# Patient Record
Sex: FEMALE | Race: WHITE | NOT HISPANIC OR LATINO | URBAN - METROPOLITAN AREA
[De-identification: names, ages, dates, MRNs, and addresses within clinical notes are randomized per-mention and may not be internally consistent; named-entity substitution may affect disease eponyms.]

---

## 2017-09-08 ENCOUNTER — OFFICE (OUTPATIENT)
Dept: URBAN - METROPOLITAN AREA CLINIC 33 | Facility: CLINIC | Age: 62
End: 2017-09-08

## 2017-09-08 VITALS
DIASTOLIC BLOOD PRESSURE: 64 MMHG | HEART RATE: 69 BPM | HEIGHT: 60 IN | SYSTOLIC BLOOD PRESSURE: 122 MMHG | TEMPERATURE: 97.5 F | WEIGHT: 215 LBS

## 2017-09-08 DIAGNOSIS — D50.9 IRON DEFICIENCY ANEMIA, UNSPECIFIED: ICD-10-CM

## 2017-09-08 DIAGNOSIS — B17.10 ACUTE HEPATITIS C WITHOUT HEPATIC COMA: ICD-10-CM

## 2017-09-08 DIAGNOSIS — K30 FUNCTIONAL DYSPEPSIA: ICD-10-CM

## 2017-09-08 PROCEDURE — 99244 OFF/OP CNSLTJ NEW/EST MOD 40: CPT

## 2017-09-08 PROCEDURE — 00031: CPT

## 2017-10-17 ENCOUNTER — ON CAMPUS - OUTPATIENT (OUTPATIENT)
Dept: URBAN - METROPOLITAN AREA HOSPITAL 35 | Facility: HOSPITAL | Age: 62
End: 2017-10-17

## 2017-10-17 DIAGNOSIS — K29.60 OTHER GASTRITIS WITHOUT BLEEDING: ICD-10-CM

## 2017-10-17 DIAGNOSIS — D50.9 IRON DEFICIENCY ANEMIA, UNSPECIFIED: ICD-10-CM

## 2017-10-17 PROCEDURE — 45378 DIAGNOSTIC COLONOSCOPY: CPT

## 2017-10-17 PROCEDURE — 43239 EGD BIOPSY SINGLE/MULTIPLE: CPT

## 2017-12-19 ENCOUNTER — OFFICE (OUTPATIENT)
Dept: URBAN - METROPOLITAN AREA CLINIC 33 | Facility: CLINIC | Age: 62
End: 2017-12-19

## 2017-12-19 VITALS
TEMPERATURE: 97.2 F | DIASTOLIC BLOOD PRESSURE: 62 MMHG | WEIGHT: 210 LBS | HEIGHT: 61 IN | SYSTOLIC BLOOD PRESSURE: 90 MMHG | HEART RATE: 67 BPM

## 2017-12-19 DIAGNOSIS — R74.8 ABNORMAL LEVELS OF OTHER SERUM ENZYMES: ICD-10-CM

## 2017-12-19 DIAGNOSIS — R53.83 OTHER FATIGUE: ICD-10-CM

## 2017-12-19 DIAGNOSIS — Z86.19 PERSONAL HISTORY OF OTHER INFECTIOUS AND PARASITIC DISEASES: ICD-10-CM

## 2017-12-19 DIAGNOSIS — D50.9 IRON DEFICIENCY ANEMIA, UNSPECIFIED: ICD-10-CM

## 2017-12-19 PROCEDURE — 99214 OFFICE O/P EST MOD 30 MIN: CPT

## 2017-12-19 NOTE — INTERFACERESULTNOTES
CMP not done but based on prior liver enzymes and current INR results, would go ahead with Fibroscan to check liver for levels of steatosis and fibrosis (scar tissue). Other labs here look good - no Hep C, no Hep B.

## 2017-12-19 NOTE — INTERFACERESULTNOTES
patient aware of results. Indicated she had the U/S complete today (1/5/18). Is questioning if the fibroscan is also necessary in addition to the U/S. Will send task to [facility] to verify.

## 2017-12-19 NOTE — SERVICEHPINOTES
63 yo female presents with her  for f/u anemia and also for elevated liver enzymes. Has had more health issues since fracturing right leg earlier this year. Is seeing Dr. Walker for anemia, Hgb was in 8 range in August. Has had 2 iron infusions with improved Hgb into 11 range. Her EGD and colonoscopy were unremarkable other than gastritis. No overt GI complaints, no black stools, etc. Has fatigue. Uses a dexamethasone mouth rinse for lichen planus. Also notes use of Magic Mouthwash that may contain both tetracycline and lidocaine which she has been swishing and swallowing for a long time. Takes pantoprazole 40 mg as well - h/o indigestion and GI complaints. Used to be on NSAIDs. Having spinal issues and also has h/o arthritis.   Notes elevated liver enzyme level - mild, this year. Has h/o Hep C, s/p treatment at [facility] circa 2001 with interferon and RBV, reportedly successful treatment. Has had low platelet count for a long time apparently. No h/o cirrhosis. 12/7/17 Hgb 11.9, plt 75, glucose 156, AST/ALT 55/31, bili 0.8, alb 3.7

## 2018-01-03 LAB
ALPHA-1-ANTITRYPSIN, SERUM: 135 MG/DL (ref 90–200)
AMBIG ABBREV LP DEFAULT: (no result)
ANTINUCLEAR ANTIBODIES, IFA: NEGATIVE
HBSAG SCREEN: NEGATIVE
HCV RT-PCR, QUANT (NON-GRAPH): HCV LOG10: (no result)
HCV RT-PCR, QUANT (NON-GRAPH): HEPATITIS C QUANTITATION: (no result) IU/ML
HCV RT-PCR, QUANT (NON-GRAPH): TEST INFORMATION: (no result)
HEMOGLOBIN A1C: 5.2 % (ref 4.8–5.6)
INSULIN: 8 UIU/ML (ref 2.6–24.9)
LIPID PANEL: CHOLESTEROL, TOTAL: 160 MG/DL (ref 100–199)
LIPID PANEL: HDL CHOLESTEROL: 68 MG/DL (ref 39–?)
LIPID PANEL: LDL CHOLESTEROL CALC: 83 MG/DL (ref 0–99)
LIPID PANEL: TRIGLYCERIDES: 46 MG/DL (ref 0–149)
LIPID PANEL: VLDL CHOLESTEROL CAL: 9 MG/DL (ref 5–40)
PROTHROMBIN TIME (PT): INR: 1.2 (ref 0.8–1.2)
PROTHROMBIN TIME (PT): PROTHROMBIN TIME: 12.5 SEC — HIGH (ref 9.1–12)
TSH: 2.56 UIU/ML (ref 0.45–4.5)
VITAMIN D, 25-HYDROXY: 42.9 NG/ML (ref 30–100)

## 2018-01-05 LAB
COMP. METABOLIC PANEL (14): A/G RATIO: 2.1 (ref 1.2–2.2)
COMP. METABOLIC PANEL (14): ALBUMIN, SERUM: 3.4 G/DL — LOW (ref 3.6–4.8)
COMP. METABOLIC PANEL (14): ALKALINE PHOSPHATASE, S: 84 IU/L (ref 39–117)
COMP. METABOLIC PANEL (14): ALT (SGPT): 23 IU/L (ref 0–32)
COMP. METABOLIC PANEL (14): AST (SGOT): 32 IU/L (ref 0–40)
COMP. METABOLIC PANEL (14): BILIRUBIN, TOTAL: 1 MG/DL (ref 0–1.2)
COMP. METABOLIC PANEL (14): BUN/CREATININE RATIO: 31 — HIGH (ref 12–28)
COMP. METABOLIC PANEL (14): BUN: 21 MG/DL (ref 8–27)
COMP. METABOLIC PANEL (14): CALCIUM, SERUM: 9.7 MG/DL (ref 8.7–10.3)
COMP. METABOLIC PANEL (14): CARBON DIOXIDE, TOTAL: 24 MMOL/L (ref 18–29)
COMP. METABOLIC PANEL (14): CHLORIDE, SERUM: 102 MMOL/L (ref 96–106)
COMP. METABOLIC PANEL (14): CREATININE, SERUM: 0.67 MG/DL (ref 0.57–1)
COMP. METABOLIC PANEL (14): EGFR IF AFRICN AM: 109 ML/MIN/1.73 (ref 59–?)
COMP. METABOLIC PANEL (14): EGFR IF NONAFRICN AM: 95 ML/MIN/1.73 (ref 59–?)
COMP. METABOLIC PANEL (14): GLOBULIN, TOTAL: 1.6 G/DL (ref 1.5–4.5)
COMP. METABOLIC PANEL (14): GLUCOSE, SERUM: 85 MG/DL (ref 65–99)
COMP. METABOLIC PANEL (14): POTASSIUM, SERUM: 4 MMOL/L (ref 3.5–5.2)
COMP. METABOLIC PANEL (14): PROTEIN, TOTAL, SERUM: 5 G/DL — LOW (ref 6–8.5)
COMP. METABOLIC PANEL (14): SODIUM, SERUM: 141 MMOL/L (ref 134–144)
WRITTEN AUTHORIZATION: (no result)

## 2018-01-16 ENCOUNTER — OFFICE (OUTPATIENT)
Dept: URBAN - METROPOLITAN AREA CLINIC 33 | Facility: CLINIC | Age: 63
End: 2018-01-16

## 2018-01-16 DIAGNOSIS — K25.7 CHRONIC GASTRIC ULCER WITHOUT HEMORRHAGE OR PERFORATION: ICD-10-CM

## 2018-01-16 DIAGNOSIS — D50.9 IRON DEFICIENCY ANEMIA, UNSPECIFIED: ICD-10-CM

## 2018-01-16 PROCEDURE — 91110 GI TRC IMG INTRAL ESOPH-ILE: CPT

## 2018-03-13 ENCOUNTER — OFFICE (OUTPATIENT)
Dept: URBAN - METROPOLITAN AREA CLINIC 33 | Facility: CLINIC | Age: 63
End: 2018-03-13

## 2018-03-13 VITALS
HEIGHT: 61 IN | HEART RATE: 57 BPM | TEMPERATURE: 97.7 F | WEIGHT: 199 LBS | DIASTOLIC BLOOD PRESSURE: 73 MMHG | SYSTOLIC BLOOD PRESSURE: 148 MMHG | SYSTOLIC BLOOD PRESSURE: 157 MMHG | DIASTOLIC BLOOD PRESSURE: 74 MMHG

## 2018-03-13 DIAGNOSIS — D50.9 IRON DEFICIENCY ANEMIA, UNSPECIFIED: ICD-10-CM

## 2018-03-13 DIAGNOSIS — R74.8 ABNORMAL LEVELS OF OTHER SERUM ENZYMES: ICD-10-CM

## 2018-03-13 DIAGNOSIS — Z86.19 PERSONAL HISTORY OF OTHER INFECTIOUS AND PARASITIC DISEASES: ICD-10-CM

## 2018-03-13 PROCEDURE — 99215 OFFICE O/P EST HI 40 MIN: CPT

## 2018-03-13 RX ORDER — PANTOPRAZOLE SODIUM 40 MG/1
TABLET, DELAYED RELEASE ORAL
Qty: 180 | Refills: 4 | Status: COMPLETED
End: 2024-03-28

## 2018-03-13 NOTE — SERVICEHPINOTES
61 yo female presents with her  for f/u anemia and also for elevated liver enzymes. Her small bowel videocapsule showed stomach ulcers. She is on pantoprazole 40 mg daily. Does also take ibuprofen and has additional risk factor of cirrhosis as found via Fibroscan. Her EGD this past fall was negative for H pylori.Her recent labs showed normal liver enzymes and no detected HCV and no other overt evidence of liver disease. Fibroscan did show moderate fatty liver and stage 4 fibrosis. She continues to follow with Dr. Walker for iron-deficiency anemia. Prior EGD and colonoscopy negative besides gastritis. Videocapsule just recently showed gastric ulcers. Prior celiac panel done by hematology reportedly negative. Prior hx: Patient has had more health issues since fracturing right leg earlier 2017. Is seeing Dr. Walker for anemia, Hgb was in 8 range in August. Has had 2 iron infusions with improved Hgb into 11 range. Her EGD and colonoscopy were unremarkable other than gastritis. No overt GI complaints, no black stools, etc. Has fatigue. Uses a dexamethasone mouth rinse for lichen planus. Also notes use of Magic Mouthwash that may contain both tetracycline and lidocaine which she has been swishing and swallowing for a long time. Takes pantoprazole 40 mg as well - h/o indigestion and GI complaints. Used to be on NSAIDs. Having spinal issues and also has h/o arthritis. Notes elevated liver enzyme level - mild. Has h/o Hep C, s/p treatment at [facility] circa 2001 with interferon and RBV, reportedly successful treatment. Has had low platelet count for a long time apparently. No h/o cirrhosis. 1/3/18 alb 3.4, AST/ALT 29/21, iron sat 54, , TG 46, HCV RNA not detected, INR 1.2, HgbA1C 5.2, TSH 2.56, Vit D 42.9, A1AT wnl, insulin 8, HBsAg neg, [place] negBR12/7/17 Hgb 11.9, plt 75, glucose 156, AST/ALT 55/31, bili 0.8, alb 3.7

## 2024-03-28 PROBLEM — Z12.11 SCREENING COLON: Status: ACTIVE | Noted: 2024-03-28

## 2024-03-28 PROBLEM — K74.60 CIRRHOSIS OF LIVER: Status: ACTIVE | Noted: 2024-03-28

## 2024-04-10 ENCOUNTER — ON CAMPUS - OUTPATIENT (OUTPATIENT)
Dept: URBAN - METROPOLITAN AREA HOSPITAL 16 | Facility: HOSPITAL | Age: 69
End: 2024-04-10
Payer: COMMERCIAL

## 2024-04-10 DIAGNOSIS — Z12.11 ENCOUNTER FOR SCREENING FOR MALIGNANT NEOPLASM OF COLON: ICD-10-CM

## 2024-04-10 PROCEDURE — G0121 COLON CA SCRN NOT HI RSK IND: HCPCS | Performed by: INTERNAL MEDICINE
